# Patient Record
Sex: MALE | Race: WHITE | NOT HISPANIC OR LATINO | URBAN - METROPOLITAN AREA
[De-identification: names, ages, dates, MRNs, and addresses within clinical notes are randomized per-mention and may not be internally consistent; named-entity substitution may affect disease eponyms.]

---
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
## 2023-12-15 NOTE — ED PEDIATRIC NURSE NOTE - ISOLATION TYPE:
[de-identified] : Constitutional: The general appearance of the patient is well developed, well nourished, no deformities and well groomed. Normal   Gait: Gait and function is as follows: normal gait.   Skin: Head and neck visualized skin is normal. Left upper extremity visualized skin is normal. Right upper extremity visualized skin is normal. Thoracic Skin of the thoracic spine shows visualized skin is normal.   Cardiovascular: palpable radial pulse bilaterally, good capillary refill in digits of the bilateral upper extremities and no temperature or color changes in the bilateral upper extremities.   Lymphatic: Normal Palpation of lymph nodes in the cervical.   Neurologic: fine motor control in the bilateral upper extremities is intact. Deep Tendon Reflexes in Upper and Lower Extremities Negative Amato's in the bilateral upper extremities. The patient is oriented to time, place and person. Sensation to light touch intact in the bilateral upper extremities. Mood and Affect is normal.   Right Shoulder: Inspection of the shoulder/upper arm is as follows: Stable shoulder. Palpation of the shoulder/upper arm is as follows: There is tenderness at the AC joint, proximal biceps tendon and supraspinatus tendon. Range of motion of the shoulder is as follows: Pain with internal rotation, external rotation, abduction and forward flexion. Strength of the shoulder is as follows: Supraspinatus 4/5. External Rotation 4/5. Internal Rotation 4/5. Infraspinatus 5/5 4/5. Deltoid 5/5 Ligament Stability and Special Tests of the shoulder is as follows: Neer test is positive. Preciado' test is positive. Speed's test is positive.   Left Shoulder: Inspection of the shoulder/upper arm is as follows: Stable shoulder. Palpation of the shoulder/upper arm is as follows: There is tenderness at the AC joint, proximal biceps tendon and supraspinatus tendon. Range of motion of the shoulder is as follows: Pain with internal rotation, external rotation, abduction and forward flexion. Strength of the shoulder is as follows: Supraspinatus 4/5. External Rotation 4/5. Internal Rotation 4/5. Infraspinatus 5/5 4/5. Deltoid 5/5 Ligament Stability and Special Tests of the shoulder is as follows: Neer test is positive. Preciado' test is positive. Speed's test is positive.   Neck: Inspection / Palpation of the cervical spine is as follows: There is a moderately restricted ROM.  stable range of motion of the cervical spine with increase tone and tenderness to palpation to b/l paracervical muscles.    Back, including spine: Inspection / Palpation of the thoracic/lumbar spine is as follows: There is a full, pain free, stable range of motion of the thoracic spine with a normal tone and not tenderness to palpation..  None

## 2024-05-30 NOTE — DISCUSSION/SUMMARY
[de-identified] : The underlying pathophysiology was reviewed in great detail with the patient as well as the various treatment options, including ice, analgesics, NSAIDs, Physical therapy, immobilization. \par \par A prescription was provided for a MRI of the right knee to rule out meniscus tear versus ligamentous injury after football injury. \par \par An ace wrap was provided and applied for comfort support and compression. Continue using crutches as needed for pain relief. \par \par Activity modifications and restrictions were discussed. \par \par FU once MRI results are obtained. \par \par All questions were answered, all alternatives discussed and the patient is in complete agreement with that plan. Follow-up appointment as instructed. Any issues and the patient will call or come in sooner.\par  (1) More than 48 hours/None

## 2025-03-21 NOTE — ED PEDIATRIC NURSE NOTE - NSSUHOSCREENINGYN_ED_ALL_ED
If it is after normal business hours please call [facility] at [phone] to speak with a nurse, or go to your nearest emergency room.     If you need to schedule, reschedule or cancel an appointment, please call the [facility] at [phone]. You may also use this number for any questions regarding the ECT Support Group.    If you have an after hours or weekend cancellation please call [phone], they will forward message to appropriate party.    You will need to arrange transportation to and from the hospital for all outpatient ECT treatments.    Bring a current list of your medications, including dosages with you to every ECT treatment and arrive 1 hour and 15 minutes before your scheduled ECT time.             
Yes - the patient is able to be screened